# Patient Record
(demographics unavailable — no encounter records)

---
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
## 2024-11-05 NOTE — REASON FOR VISIT
[Behavioral Health Urgent Care Assessment] : a behavioral health urgent care assessment [Patient] : patient [Self] : alone [Other: _____] : with [unfilled] [[employer] ] : provided by [employer]   [Time Spent: ____ minutes] : Total time spent using  services: [unfilled] minutes. The patient's primary language is not English thus required  services. [TextBox_17] : trauma history; connection to ongoing care [Interpreters_IDNumber] : [id number] [Interpreters_FullName] : Fiordaliza [TWNoteComboBox1] : Portuguese
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
## 2024-11-05 NOTE — REASON FOR VISIT
[Behavioral Health Urgent Care Assessment] : a behavioral health urgent care assessment [Patient] : patient [Self] : alone [Other: _____] : with [unfilled] [[employer] ] : provided by [employer]   [Time Spent: ____ minutes] : Total time spent using  services: [unfilled] minutes. The patient's primary language is not English thus required  services. [TextBox_17] : trauma history; connection to ongoing care [Interpreters_IDNumber] : [id number] [Interpreters_FullName] : Fiordaliza [TWNoteComboBox1] : Latvian

## 2024-11-05 NOTE — PHYSICAL EXAM
[Avoidant] : avoidant [Anxious] : anxious [Evasive] : evasive [Constricted] : constricted [Underproductive] : underproductive [Soft] : soft [[place]] : concrete [None Reported] : none reported [Average] : average [Difficulty acknowledging presence of psychiatric problems] : Difficulty acknowledging presence of psychiatric problems [WNL] : within normal limits [Positive interaction] : positive interaction [Unremarkable/age appropriate] : unremarkable/age appropriate [Normal] : normal [None] : none
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
## 2024-11-05 NOTE — PHYSICAL EXAM
[Avoidant] : avoidant [Anxious] : anxious [Evasive] : evasive [Constricted] : constricted [Underproductive] : underproductive [Soft] : soft [[place]] : concrete [None Reported] : none reported [Average] : average [Difficulty acknowledging presence of psychiatric problems] : Difficulty acknowledging presence of psychiatric problems [WNL] : within normal limits [Positive interaction] : positive interaction [Unremarkable/age appropriate] : unremarkable/age appropriate [Normal] : normal [None] : none

## 2024-11-05 NOTE — PLAN
[Provision of National Suicide Prevention Lifeline 4-398-546-TALK (6553)] : Provision of national suicide prevention lifeline 2-506-548-talk (7705) [Patient] : patient [Family] : family [Education provided regarding environmental safety/ lethal means restriction] : Education provided regarding environmental safety/ lethal means restriction [Contact was Attempted] : contact was attempted [Reached regarding Plan] : reached regarding plan [None on Record] : none on record [TextBox_9] : urgent referral for individual therapy  [TextBox_11] : not indicated  [TextBox_13] : Safety plan completed with patient using the "[facility] Safety Plan."  The Safety Plan is a best practice recommendation by the Suicide Prevention Resource Center.  Safety planning reviewed with patient & family. Advised to secure all potentially dangerous items from home, including but not limited to sharp objects, weapons, prescription and non-prescription medications, and other lethal means out of patient's reach. They deny having any firearms at home. Parent agreed. Parent and patient advised to visit the nearest ED or call 911 for any worsening symptoms or if safety concerns arise. 1800-LIFENET provided. All involved verbalized understanding.  Please see scanned safety plan for additional details. [TextBox_26] : [facility] reached out to school contact

## 2024-11-05 NOTE — DISCUSSION/SUMMARY
[Low acute suicide risk] : Low acute suicide risk [Yes] : Safety Plan completed/updated (for individuals at risk): Yes [FreeTextEntry1] : At present, patient has a low acute risk of harm to self.  Although patient has risk factors including history of trauma, NSSIB, as well as sxs of depression and anxiety. Patient has significant protective factors including strong family support, domiciled, age, no suicide attempts, no substance use, no selene, no psychosis, no CAH, no psychiatric hospitalization, current willingness to engage in treatment, participation in safety planning, hopeful, help-seeking, engaged in school & activities, current denial of any SIIP or urges to self-harm, no reported hx of abuse/trauma, no aggression/violence, no access to guns/family is able to means restrict, no legal history. [FreeTextEntry3] : safety planning/lethal means restriction discussed with aunt, safety planning checklist provided during visit

## 2024-11-05 NOTE — PLAN
[Provision of National Suicide Prevention Lifeline 5-375-919-TALK (7101)] : Provision of national suicide prevention lifeline 2-154-017-talk (7201) [Patient] : patient [Family] : family [Education provided regarding environmental safety/ lethal means restriction] : Education provided regarding environmental safety/ lethal means restriction [Contact was Attempted] : contact was attempted [Reached regarding Plan] : reached regarding plan [None on Record] : none on record [TextBox_9] : urgent referral for individual therapy  [TextBox_11] : not indicated  [TextBox_13] : Safety plan completed with patient using the "[facility] Safety Plan."  The Safety Plan is a best practice recommendation by the Suicide Prevention Resource Center.  Safety planning reviewed with patient & family. Advised to secure all potentially dangerous items from home, including but not limited to sharp objects, weapons, prescription and non-prescription medications, and other lethal means out of patient's reach. They deny having any firearms at home. Parent agreed. Parent and patient advised to visit the nearest ED or call 911 for any worsening symptoms or if safety concerns arise. 1800-LIFENET provided. All involved verbalized understanding.  Please see scanned safety plan for additional details. [TextBox_26] : [facility] reached out to school contact